# Patient Record
Sex: FEMALE | Race: WHITE | NOT HISPANIC OR LATINO | Employment: UNEMPLOYED | URBAN - METROPOLITAN AREA
[De-identification: names, ages, dates, MRNs, and addresses within clinical notes are randomized per-mention and may not be internally consistent; named-entity substitution may affect disease eponyms.]

---

## 2023-03-31 ENCOUNTER — APPOINTMENT (OUTPATIENT)
Dept: LAB | Facility: LAB | Age: 48
End: 2023-03-31
Payer: MEDICARE

## 2023-03-31 LAB
ALANINE AMINOTRANSFERASE (SGPT) (U/L) IN SER/PLAS: 14 U/L (ref 7–45)
ALBUMIN (G/DL) IN SER/PLAS: 4.3 G/DL (ref 3.4–5)
ALKALINE PHOSPHATASE (U/L) IN SER/PLAS: 42 U/L (ref 33–110)
ANION GAP IN SER/PLAS: 15 MMOL/L (ref 10–20)
ASPARTATE AMINOTRANSFERASE (SGOT) (U/L) IN SER/PLAS: 11 U/L (ref 9–39)
BILIRUBIN TOTAL (MG/DL) IN SER/PLAS: 0.4 MG/DL (ref 0–1.2)
CALCIDIOL (25 OH VITAMIN D3) (NG/ML) IN SER/PLAS: 60 NG/ML
CALCIUM (MG/DL) IN SER/PLAS: 9.6 MG/DL (ref 8.6–10.6)
CARBON DIOXIDE, TOTAL (MMOL/L) IN SER/PLAS: 24 MMOL/L (ref 21–32)
CHLORIDE (MMOL/L) IN SER/PLAS: 106 MMOL/L (ref 98–107)
CREATININE (MG/DL) IN SER/PLAS: 1.03 MG/DL (ref 0.5–1.05)
ESTIMATED AVERAGE GLUCOSE FOR HBA1C: 105 MG/DL
FOLLITROPIN (IU/L) IN SER/PLAS: 35.2 IU/L
GFR FEMALE: 67 ML/MIN/1.73M2
GLUCOSE (MG/DL) IN SER/PLAS: 97 MG/DL (ref 74–99)
HEMOGLOBIN A1C/HEMOGLOBIN TOTAL IN BLOOD: 5.3 %
LUTEINIZING HORMONE (IU/ML) IN SER/PLAS: 20.8 IU/L
PARATHYRIN INTACT (PG/ML) IN SER/PLAS: 23.3 PG/ML (ref 18.5–88)
POTASSIUM (MMOL/L) IN SER/PLAS: 4.4 MMOL/L (ref 3.5–5.3)
PROLACTIN (UG/L) IN SER/PLAS: 14 UG/L (ref 3–20)
PROTEIN TOTAL: 6.9 G/DL (ref 6.4–8.2)
SODIUM (MMOL/L) IN SER/PLAS: 141 MMOL/L (ref 136–145)
THYROPEROXIDASE AB (IU/ML) IN SER/PLAS: <28 IU/ML
THYROTROPIN (MIU/L) IN SER/PLAS BY DETECTION LIMIT <= 0.05 MIU/L: 0.26 MIU/L (ref 0.44–3.98)
THYROXINE (T4) FREE (NG/DL) IN SER/PLAS: 1.45 NG/DL (ref 0.78–1.48)
TRIIODOTHYRONINE (T3) (NG/DL) IN SER/PLAS: 141 NG/DL (ref 60–200)
UREA NITROGEN (MG/DL) IN SER/PLAS: 10 MG/DL (ref 6–23)

## 2023-04-21 ENCOUNTER — HOSPITAL ENCOUNTER (OUTPATIENT)
Dept: DATA CONVERSION | Facility: HOSPITAL | Age: 48
End: 2023-04-21
Attending: OTOLARYNGOLOGY | Admitting: OTOLARYNGOLOGY
Payer: MEDICARE

## 2023-04-21 DIAGNOSIS — Z87.891 PERSONAL HISTORY OF NICOTINE DEPENDENCE: ICD-10-CM

## 2023-04-21 DIAGNOSIS — E03.9 HYPOTHYROIDISM, UNSPECIFIED: ICD-10-CM

## 2023-04-21 DIAGNOSIS — K21.9 GASTRO-ESOPHAGEAL REFLUX DISEASE WITHOUT ESOPHAGITIS: ICD-10-CM

## 2023-04-21 DIAGNOSIS — M79.7 FIBROMYALGIA: ICD-10-CM

## 2023-04-21 DIAGNOSIS — E06.3 AUTOIMMUNE THYROIDITIS: ICD-10-CM

## 2023-04-21 DIAGNOSIS — E66.9 OBESITY, UNSPECIFIED: ICD-10-CM

## 2023-04-21 DIAGNOSIS — F31.9 BIPOLAR DISORDER, UNSPECIFIED (MULTI): ICD-10-CM

## 2023-04-21 DIAGNOSIS — J34.89 OTHER SPECIFIED DISORDERS OF NOSE AND NASAL SINUSES: ICD-10-CM

## 2023-04-21 DIAGNOSIS — J32.8 OTHER CHRONIC SINUSITIS: ICD-10-CM

## 2023-04-21 DIAGNOSIS — J32.3 CHRONIC SPHENOIDAL SINUSITIS: ICD-10-CM

## 2023-04-21 DIAGNOSIS — J34.2 DEVIATED NASAL SEPTUM: ICD-10-CM

## 2023-05-17 LAB
COMPLETE PATHOLOGY REPORT: NORMAL
CONVERTED FINAL DIAGNOSIS: NORMAL
CONVERTED FINAL REPORT PDF LINK TO COPY AND PASTE: NORMAL
CONVERTED GROSS DESCRIPTION: NORMAL

## 2023-09-14 NOTE — H&P
History & Physical Reviewed:   Pregnant/Lactating:  ·  Are You Pregnant no   ·  Are You Currently Breastfeeding no     I have reviewed the History and Physical dated:  24-Mar-2023   History and Physical reviewed and relevant findings noted. Patient examined to review pertinent physical  findings.: No significant changes   Home Medications Reviewed: no changes noted   Allergies Reviewed: no changes noted       ERAS (Enhanced Recovery After Surgery):  ·  ERAS Patient: no     Consent:   COVID-19 Consent:  ·  COVID-19 Risk Consent Surgeon has reviewed key risks related to the risk of charisse COVID-19 and if they contract COVID-19 what the risks are.     Attestation:   Note Completion:  I am a:  Resident/Fellow   Attending Attestation I saw and evaluated the patient.  I personally obtained the key and critical portions of the history and physical exam or was physically present for key and  critical portions performed by the resident/fellow. I reviewed the resident/fellow?s documentation and discussed the patient with the resident/fellow.  I agree with the resident/fellow?s medical decision making as documented in the note.     I personally evaluated the patient on 21-Apr-2023         Electronic Signatures:  Deana Cerda (Resident))  (Signed 21-Apr-2023 07:30)   Authored: History & Physical Reviewed, ERAS, Consent,  Note Completion  Neri Saldana)  (Signed 27-Apr-2023 11:58)   Authored: Note Completion   Co-Signer: History & Physical Reviewed, ERAS, Consent, Note Completion      Last Updated: 27-Apr-2023 11:58 by Neri Saldana)

## 2023-10-02 NOTE — OP NOTE
PROCEDURE DETAILS    Preoperative Diagnosis:  1. Nasal obstruction  2. Septal deviation  3. Nasal valve collapse  4. Inferior turbinate hypertrophy   Postoperative Diagnosis:  1. Nasal obstruction  2. Septal deviation  3. Nasal valve collapse  4. Inferior turbinate hypertrophy   Surgeon: Vick Sr MD  Resident/Fellow/Other Assistant: Deana Cerda MD     Procedure:  1. Septoplasty  2. Nasal valve repair with alar rim grafts  3. Inferior turbinate reduction and outfracture   Anesthesia: GETA  Estimated Blood Loss: 5  Findings: left caudal septal deviation, S-shaped septal deviation posteriorly, alar rim grafts placed, Pond splints   Specimens(s) Collected: no,     Complications: none  Implants: Pond splints   Patient Returned To/Condition: pacu/satisfactory         Operative Report:   The patient presented with nasal obstruction.  The patient was found to have significant nasal septal deviation, nasal valve collapse, and inferior turbinate  hypertrophy.  I discussed with the patient, in detail, the risks, benefits, limitations, and alternatives to the planned procedures. Risks discussed included but were not limited to infection, bleeding, septal perforation, synechia, need for further surgery,  failure to achieve our desired results, worsened breathing, and worsened appearance. The patient expressed understanding of each of these complications and had all questions answered.    DESCRIPTION OF PROCEDURE:  The patient was brought to the operating room and placed in the supine position, then intubated under general anesthesia.  The nose was injected with 1% lidocaine with epinephrine and then packed with decongestant-soaked pledgets.  The face was prepped  and draped in the usual sterile fashion.  Dr. Saldana completed his portion of the procedure - please see his operative note for further details. A left hemitransfixion incision was performed and mucoperichondrial flaps were elevated on the left and   subsequently the right.  The deviated septum was treated by removing deformed quadrangular cartilage and bony septum and septal cartilage was harvested, taking care to preserve a >1.0 cm L-shaped strut.     The caudal septum was freed from the anterior nasal spine. There was a stepoff on the right inferior caudal septum along the maxillary crest that was removed with a osteotome. The caudal deviation was C-shaped and the caudal septum was secured to the  anterior nasal spine with PDS. This   was felt to be more midline with improvement in the internal nasal valve.     To optimize the shape and position of the ala, alar rim grafts were shaped from the removed cartilage and placed. An alar incision was made first on the left and then on the right and a pocket in the rim was opened using iris scissors. Bilateral alar  rim grafts were then placed. The incision was closed with 5-0 fast interrupted sutures.     Bilateral inferior turbinate reduction and lateralization was then performed.  A [place] fine needle tip bovie electrocautery was used to perform intramural cauterization for submucous resection bilaterally.  A [place] elevator was then used to outfracture  the inferior turbinate bilaterally.     We then turned our attention to closure.  Meticulous closure was performed using gut suture in a simple, interrupted fashion.  The nose was gently cleansed with sterile saline and Pond splints were placed in the usual fashion.  This concluded the goals  of the procedure.  The patient was turned over to Anesthesia, extubated, and transferred to the PACU.    Dr. Sr was present for critical portions of the procedure.,                        Attestation:   Note Completion:  Attending Attestation I was present for the entire procedure    I am a: Resident/Fellow         Electronic Signatures:  Deana Cerda (Resident))  (Signed 21-Apr-2023 15:02)   Authored: Post-Operative Note, Chart Review, Note Completion  Orin  Vick BUTTS)  (Signed 24-Apr-2023 07:45)   Authored: Post-Operative Note, Chart Review, Note Completion   Co-Signer: Post-Operative Note, Chart Review, Note Completion      Last Updated: 24-Apr-2023 07:45 by Vick Sr)

## 2023-10-31 ENCOUNTER — TELEPHONE (OUTPATIENT)
Dept: OTOLARYNGOLOGY | Facility: CLINIC | Age: 48
End: 2023-10-31
Payer: MEDICARE

## 2023-10-31 DIAGNOSIS — M89.8X8 MASS OF PETROUS TEMPORAL BONE: ICD-10-CM

## 2023-10-31 DIAGNOSIS — G44.211 INTRACTABLE EPISODIC TENSION-TYPE HEADACHE: ICD-10-CM

## 2023-10-31 NOTE — TELEPHONE ENCOUNTER
Patient called with severe headaches for the past week. She was able to get an appointment next week. Dr. 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 Patient is aware of the plan and order has been placed

## 2023-11-03 ENCOUNTER — HOSPITAL ENCOUNTER (OUTPATIENT)
Dept: RADIOLOGY | Facility: HOSPITAL | Age: 48
Discharge: HOME | End: 2023-11-03
Payer: MEDICARE

## 2023-11-03 DIAGNOSIS — M89.8X8 MASS OF PETROUS TEMPORAL BONE: ICD-10-CM

## 2023-11-03 DIAGNOSIS — G44.211 INTRACTABLE EPISODIC TENSION-TYPE HEADACHE: ICD-10-CM

## 2023-11-03 PROBLEM — R60.9 SWELLING OF BODY REGION: Status: ACTIVE | Noted: 2023-11-03

## 2023-11-03 PROBLEM — J32.3 CHRONIC SPHENOIDAL SINUSITIS: Status: ACTIVE | Noted: 2023-11-03

## 2023-11-03 PROBLEM — Z86.19 HX OF HERPES GENITALIS: Status: ACTIVE | Noted: 2023-11-03

## 2023-11-03 PROBLEM — M25.376 INSTABILITY OF FOOT JOINT: Status: ACTIVE | Noted: 2018-08-06

## 2023-11-03 PROBLEM — F41.1 GAD (GENERALIZED ANXIETY DISORDER): Status: ACTIVE | Noted: 2017-09-18

## 2023-11-03 PROBLEM — M54.81 OCCIPITAL NEURALGIA OF RIGHT SIDE: Status: ACTIVE | Noted: 2023-11-03

## 2023-11-03 PROBLEM — D36.10 NEUROMA: Status: ACTIVE | Noted: 2018-08-06

## 2023-11-03 PROBLEM — R30.0 DYSURIA: Status: ACTIVE | Noted: 2023-11-03

## 2023-11-03 PROBLEM — G43.909 MIGRAINE: Status: ACTIVE | Noted: 2023-11-03

## 2023-11-03 PROBLEM — M79.671 PAIN IN RIGHT FOOT: Status: ACTIVE | Noted: 2018-08-06

## 2023-11-03 PROBLEM — F98.8 ATTENTION DEFICIT DISORDER (ADD) WITHOUT HYPERACTIVITY: Status: ACTIVE | Noted: 2017-06-30

## 2023-11-03 PROBLEM — R51.9 GENERALIZED HEADACHES: Status: ACTIVE | Noted: 2023-11-03

## 2023-11-03 PROBLEM — R22.0 SWOLLEN NOSE: Status: ACTIVE | Noted: 2023-11-03

## 2023-11-03 PROBLEM — M95.0 NASAL DEFORMITY: Status: ACTIVE | Noted: 2023-11-03

## 2023-11-03 PROBLEM — Q30.9 NOSE ABNORMALITY: Status: ACTIVE | Noted: 2023-11-03

## 2023-11-03 PROBLEM — R09.81 NASAL CONGESTION WITH RHINORRHEA: Status: ACTIVE | Noted: 2023-11-03

## 2023-11-03 PROBLEM — F33.0 MILD EPISODE OF RECURRENT MAJOR DEPRESSIVE DISORDER (CMS-HCC): Status: ACTIVE | Noted: 2023-11-03

## 2023-11-03 PROBLEM — R44.8 FACIAL PRESSURE: Status: ACTIVE | Noted: 2023-11-03

## 2023-11-03 PROBLEM — E66.9 OBESITY (BMI 30.0-34.9): Status: ACTIVE | Noted: 2017-04-28

## 2023-11-03 PROBLEM — F90.9 ADHD: Status: ACTIVE | Noted: 2023-11-03

## 2023-11-03 PROBLEM — J39.2 THORNWALDT'S CYST: Status: ACTIVE | Noted: 2023-11-03

## 2023-11-03 PROBLEM — E66.9 OBESITY: Status: ACTIVE | Noted: 2023-11-03

## 2023-11-03 PROBLEM — J34.89 NASAL CONGESTION WITH RHINORRHEA: Status: ACTIVE | Noted: 2023-11-03

## 2023-11-03 PROBLEM — M77.42 METATARSALGIA OF BOTH FEET: Status: ACTIVE | Noted: 2018-08-06

## 2023-11-03 PROBLEM — F90.9 ADULT ADHD: Status: ACTIVE | Noted: 2017-04-03

## 2023-11-03 PROBLEM — E55.9 VITAMIN D DEFICIENCY: Status: ACTIVE | Noted: 2017-04-03

## 2023-11-03 PROBLEM — H74.8X9: Status: ACTIVE | Noted: 2023-11-03

## 2023-11-03 PROBLEM — M77.41 METATARSALGIA OF BOTH FEET: Status: ACTIVE | Noted: 2018-08-06

## 2023-11-03 PROBLEM — E66.811 OBESITY (BMI 30.0-34.9): Status: ACTIVE | Noted: 2017-04-28

## 2023-11-03 PROBLEM — M25.519 SHOULDER PAIN: Status: ACTIVE | Noted: 2018-01-13

## 2023-11-03 PROBLEM — E23.6 PITUITARY MASS (MULTI): Status: ACTIVE | Noted: 2023-11-03

## 2023-11-03 PROBLEM — Z98.82 H/O BREAST AUGMENTATION: Status: ACTIVE | Noted: 2022-05-25

## 2023-11-03 PROBLEM — R53.83 FATIGUE: Status: ACTIVE | Noted: 2017-04-03

## 2023-11-03 PROBLEM — E53.8 VITAMIN B 12 DEFICIENCY: Status: ACTIVE | Noted: 2017-04-03

## 2023-11-03 PROBLEM — L03.113 CELLULITIS OF RIGHT HAND: Status: ACTIVE | Noted: 2023-11-03

## 2023-11-03 PROBLEM — H71.90: Status: ACTIVE | Noted: 2023-11-03

## 2023-11-03 PROCEDURE — 70480 CT ORBIT/EAR/FOSSA W/O DYE: CPT | Performed by: RADIOLOGY

## 2023-11-03 PROCEDURE — 70480 CT ORBIT/EAR/FOSSA W/O DYE: CPT

## 2023-11-03 RX ORDER — ERGOCALCIFEROL 1.25 MG/1
1 CAPSULE ORAL
COMMUNITY

## 2023-11-03 RX ORDER — NORETHINDRONE ACETATE AND ETHINYL ESTRADIOL 1MG-20(21)
1 KIT ORAL DAILY
COMMUNITY
Start: 2018-09-19

## 2023-11-03 RX ORDER — TRAMADOL HYDROCHLORIDE 50 MG/1
1 TABLET ORAL EVERY 8 HOURS PRN
COMMUNITY
Start: 2022-04-15

## 2023-11-03 RX ORDER — NORETHINDRONE ACETATE AND ETHINYL ESTRADIOL 1; 20 MG/1; UG/1
1 TABLET ORAL DAILY
COMMUNITY
Start: 2022-12-12 | End: 2023-03-06 | Stop reason: WASHOUT

## 2023-11-03 RX ORDER — LANSOPRAZOLE 30 MG/1
CAPSULE, DELAYED RELEASE ORAL
COMMUNITY
Start: 2020-11-18

## 2023-11-03 RX ORDER — SODIUM CHLORIDE 0.65 %
2 AEROSOL, SPRAY (ML) NASAL EVERY 4 HOURS
COMMUNITY
Start: 2023-04-24

## 2023-11-03 RX ORDER — LISDEXAMFETAMINE DIMESYLATE 50 MG/1
50 CAPSULE ORAL DAILY
COMMUNITY

## 2023-11-03 RX ORDER — TRAZODONE HYDROCHLORIDE 150 MG/1
150 TABLET ORAL NIGHTLY
COMMUNITY
Start: 2020-03-03

## 2023-11-03 RX ORDER — BROMPHENIRAMINE MALEATE, PSEUDOEPHEDRINE HYDROCHLORIDE, AND DEXTROMETHORPHAN HYDROBROMIDE 2; 30; 10 MG/5ML; MG/5ML; MG/5ML
SYRUP ORAL 4 TIMES DAILY PRN
COMMUNITY
Start: 2022-09-15

## 2023-11-03 RX ORDER — FLUTICASONE PROPIONATE 50 MCG
2 SPRAY, SUSPENSION (ML) NASAL DAILY
COMMUNITY

## 2023-11-03 RX ORDER — LEVOTHYROXINE SODIUM 125 UG/1
125 TABLET ORAL DAILY
COMMUNITY
Start: 2020-02-04

## 2023-11-03 RX ORDER — DOCUSATE SODIUM 100 MG/1
100 CAPSULE, LIQUID FILLED ORAL 2 TIMES DAILY
COMMUNITY
Start: 2022-06-10

## 2023-11-03 RX ORDER — NAPROXEN 500 MG/1
1 TABLET ORAL 2 TIMES DAILY PRN
COMMUNITY
Start: 2022-03-10

## 2023-11-03 RX ORDER — ZOLPIDEM TARTRATE 5 MG/1
5 TABLET ORAL NIGHTLY PRN
COMMUNITY

## 2023-11-03 RX ORDER — BUTYROSPERMUM PARKII(SHEA BUTTER), SIMMONDSIA CHINENSIS (JOJOBA) SEED OIL, ALOE BARBADENSIS LEAF EXTRACT .01; 1; 3.5 G/100G; G/100G; G/100G
250 LIQUID TOPICAL 2 TIMES DAILY PRN
COMMUNITY
Start: 2022-09-15

## 2023-11-03 RX ORDER — BREXPIPRAZOLE 1 MG/1
1.5 TABLET ORAL DAILY
COMMUNITY
Start: 2023-01-15

## 2023-11-03 RX ORDER — SYRINGE WITH NEEDLE, 1 ML 25GX5/8"
SYRINGE, EMPTY DISPOSABLE MISCELLANEOUS
COMMUNITY
Start: 2023-07-12

## 2023-11-03 RX ORDER — CHOLECALCIFEROL (VITAMIN D3) 125 MCG
1 CAPSULE ORAL DAILY
COMMUNITY
Start: 2017-12-11

## 2023-11-03 RX ORDER — ACETAMINOPHEN 500 MG
1 TABLET ORAL EVERY 6 HOURS PRN
COMMUNITY
Start: 2022-06-10

## 2023-11-03 RX ORDER — IBUPROFEN 600 MG/1
600 TABLET ORAL EVERY 8 HOURS PRN
COMMUNITY
Start: 2023-07-24

## 2023-11-03 RX ORDER — DEXTROAMPHETAMINE SACCHARATE, AMPHETAMINE ASPARTATE MONOHYDRATE, DEXTROAMPHETAMINE SULFATE AND AMPHETAMINE SULFATE 7.5; 7.5; 7.5; 7.5 MG/1; MG/1; MG/1; MG/1
30 CAPSULE, EXTENDED RELEASE ORAL DAILY
COMMUNITY

## 2023-11-03 RX ORDER — SODIUM CHLORIDE 0.65 %
2 AEROSOL, SPRAY (ML) NASAL 4 TIMES DAILY
COMMUNITY
Start: 2022-09-15

## 2023-11-03 RX ORDER — LURASIDONE HYDROCHLORIDE 40 MG/1
80 TABLET, FILM COATED ORAL DAILY
COMMUNITY
End: 2024-02-26 | Stop reason: WASHOUT

## 2023-11-03 RX ORDER — EPINEPHRINE 0.3 MG/.3ML
INJECTION SUBCUTANEOUS
COMMUNITY
Start: 2021-06-26

## 2023-11-03 RX ORDER — ALBUTEROL SULFATE 90 UG/1
INHALANT RESPIRATORY (INHALATION) 4 TIMES DAILY PRN
COMMUNITY
Start: 2022-09-16

## 2023-11-03 RX ORDER — TRAMADOL HYDROCHLORIDE 50 MG/1
50 TABLET ORAL EVERY 6 HOURS PRN
COMMUNITY

## 2023-11-03 RX ORDER — CYANOCOBALAMIN 1000 UG/ML
1000 INJECTION, SOLUTION INTRAMUSCULAR; SUBCUTANEOUS
COMMUNITY

## 2023-11-03 RX ORDER — ALBUTEROL SULFATE 0.83 MG/ML
2.5 SOLUTION RESPIRATORY (INHALATION) EVERY 6 HOURS PRN
COMMUNITY
Start: 2022-10-05

## 2023-11-03 RX ORDER — ONDANSETRON 4 MG/1
4 TABLET, FILM COATED ORAL EVERY 6 HOURS PRN
COMMUNITY
End: 2024-02-26 | Stop reason: SDUPTHER

## 2023-11-03 RX ORDER — ESCITALOPRAM OXALATE 20 MG/1
20 TABLET ORAL EVERY MORNING
COMMUNITY
Start: 2015-03-27

## 2023-11-03 RX ORDER — LEVOTHYROXINE SODIUM 88 UG/1
88 TABLET ORAL DAILY
COMMUNITY
Start: 2013-08-01

## 2023-11-03 RX ORDER — LAMOTRIGINE 200 MG/1
1 TABLET ORAL NIGHTLY
COMMUNITY
Start: 2014-02-17

## 2023-11-03 RX ORDER — LIOTHYRONINE SODIUM 5 UG/1
5 TABLET ORAL DAILY
COMMUNITY

## 2023-11-03 RX ORDER — DEXTROAMPHETAMINE SULFATE, DEXTROAMPHETAMINE SACCHARATE, AMPHETAMINE SULFATE AND AMPHETAMINE ASPARTATE 5; 5; 5; 5 MG/1; MG/1; MG/1; MG/1
2 CAPSULE, EXTENDED RELEASE ORAL EVERY MORNING
COMMUNITY
Start: 2013-11-26

## 2023-11-03 RX ORDER — NORETHINDRONE ACETATE AND ETHINYL ESTRADIOL, ETHINYL ESTRADIOL AND FERROUS FUMARATE 1MG-10(24)
1 KIT ORAL DAILY
COMMUNITY

## 2023-11-06 ENCOUNTER — CLINICAL SUPPORT (OUTPATIENT)
Dept: AUDIOLOGY | Facility: CLINIC | Age: 48
End: 2023-11-06
Payer: MEDICARE

## 2023-11-06 ENCOUNTER — OFFICE VISIT (OUTPATIENT)
Dept: OTOLARYNGOLOGY | Facility: CLINIC | Age: 48
End: 2023-11-06
Payer: MEDICARE

## 2023-11-06 VITALS — HEIGHT: 61 IN | WEIGHT: 192.5 LBS | BODY MASS INDEX: 36.35 KG/M2

## 2023-11-06 DIAGNOSIS — M89.8X8 MASS OF PETROUS TEMPORAL BONE: Primary | ICD-10-CM

## 2023-11-06 DIAGNOSIS — H93.19 TINNITUS, UNSPECIFIED LATERALITY: ICD-10-CM

## 2023-11-06 DIAGNOSIS — H90.3 SENSORINEURAL HEARING LOSS (SNHL) OF BOTH EARS: Primary | ICD-10-CM

## 2023-11-06 DIAGNOSIS — D33.3 CEREBELLOPONTINE ANGLE TUMOR (MULTI): ICD-10-CM

## 2023-11-06 PROCEDURE — 1036F TOBACCO NON-USER: CPT | Performed by: OTOLARYNGOLOGY

## 2023-11-06 PROCEDURE — 92550 TYMPANOMETRY & REFLEX THRESH: CPT

## 2023-11-06 PROCEDURE — 99214 OFFICE O/P EST MOD 30 MIN: CPT | Performed by: OTOLARYNGOLOGY

## 2023-11-06 PROCEDURE — 92557 COMPREHENSIVE HEARING TEST: CPT

## 2023-11-06 RX ORDER — LEVOFLOXACIN 500 MG/1
500 TABLET, FILM COATED ORAL
Qty: 7 TABLET | Refills: 0 | Status: SHIPPED | OUTPATIENT
Start: 2023-11-06 | End: 2023-11-13

## 2023-11-06 ASSESSMENT — PATIENT HEALTH QUESTIONNAIRE - PHQ9
2. FEELING DOWN, DEPRESSED OR HOPELESS: NOT AT ALL
1. LITTLE INTEREST OR PLEASURE IN DOING THINGS: NOT AT ALL
SUM OF ALL RESPONSES TO PHQ9 QUESTIONS 1 & 2: 0

## 2023-11-06 ASSESSMENT — PAIN SCALES - GENERAL
PAINLEVEL_OUTOF10: 4
PAINLEVEL: 4

## 2023-11-06 ASSESSMENT — PAIN DESCRIPTION - DESCRIPTORS: DESCRIPTORS: DULL

## 2023-11-06 NOTE — PROGRESS NOTES
AUDIOLOGIC EVALUATION  Name: Katy Hogue  YOB: 1975  MRN: [id number]  Age: 48 y.o.    Date of Evaluation:  11/6/2023    History:  Katy Hogue, 48 y.o., was seen today at the request of Shiv Pa MD for a hearing evaluation. Recall, she has a history of a right-sided petrous apex lesion.  The patient reported a family history of hearing loss. She noted tinnitus. She reported a constant dull ache in her right ear secondary to headache. She has a history of vertiginous dizziness a few times a month. She reported that she will wake up with this dizziness and do the Epley maneuver at home to treat it. She denied a history of ear surgeries.     Previous audiologic evaluation on 5/27/2021 at an outside clinic revealed normal hearing though 2000 Hz sloping to a mild hearing loss in the right ear and normal hearing through 2000 Hz sloping to a moderate sensorineural hearing loss in the left ear. Word recognition abilities were measured to be excellent in both ears.     Evaluation:    Otoscopy  Clear ear canals bilaterally.    Tympanometry  Right ear:Type AD tympanogram, normal ear canal volume and high compliance  Left ear: Type A tympanogram, normal ear canal volume and compliance     Acoustic Reflexes  Right ear: Ipsilateral acoustic reflexes present at 500 - 1000 Hz (no response at 2000 - 4000 Hz). Contralateral (probe right, stimulus left) acoustic reflexes present 500 - 4000 Hz. Acoustic reflex decay could not be tested due to equipment limits at 500 Hz and high stimulus artifact at 1000 Hz.  Left ear: Ipsilateral acoustic reflexes present at 500 - 2000 Hz (no response at 4000 Hz). Contralateral (probe left, stimulus right) acoustic reflexes absent 500 - 4000 Hz. Acoustic reflex decay could not be tested due to equipment limits.     Audiometric Evaluation  Right ear: hearing sensitivity within normal limits at 250 Hz sloping to essentially mild sensorineural hearing loss. Word recognition  ability estimated to be excellent (96%) at 65 dB HL based on an NU-6 recorded 25-word list.  Left ear: hearing sensitivity within normal limits at 250 Hz sloping to a moderately-severe sensorineural hearing loss. Word recognition ability estimated to be excellent (100%) at 70 dB HL based on an NU-6 recorded ordered by difficulty 10-word list.    When compared to previous test results of 5/27/2021, thresholds are decreased from 250 - 2000 Hz in both ears. All other thresholds are stable.     The test results were discussed with the patient and they were returned to Shiv Pa MD for completion of the office visit.    Impressions  Today's evaluation revealed normal hearing at 250 Hz sloping to an essentially mild sensorineural hearing loss in the right ear and hearing sensitivity within normal limits at 250 Hz sloping to a moderately-severe sensorineural hearing loss in the left ear. Word recognition abilities were measured to be excellent in both ears. Tympanograms were type A (normal) in both ears.    The patient was informed that they are a candidate for binaural amplification. She stated that she is not interested in hearing aids at this time.     Recommendations  - Continue medical follow-up with established providers   - Continue medical follow-up with Shiv Pa MD  - Re-test hearing annually  - Consider binaural amplification    Time: 1140-2489    RICKIE Campoverde, CCC-A  Licensed Audiologist

## 2023-11-06 NOTE — LETTER
November 6, 2023     Katie Gutierrez MD  [address] [place] [phone]    Patient: Katy Hogue   YOB: 1975   Date of Visit: 11/6/2023       Dear Dr. Katie Gutierrez MD:    Thank you for referring Katy Hogue to me for evaluation. Below are my notes for this consultation.  If you have questions, please do not hesitate to call me. I look forward to following your patient along with you.       Sincerely,     RICKIE Campoverde, CCC-A      CC: No Recipients  ______________________________________________________________________________________    AUDIOLOGIC EVALUATION  Name: Katy Hogue  YOB: 1975  MRN: [id number]  Age: 48 y.o.    Date of Evaluation:  11/6/2023    History:  Katy Hogue, 48 y.o., was seen today at the request of Shiv Pa MD for a hearing evaluation. Recall, she has a history of a right-sided petrous apex lesion.  The patient reported a family history of hearing loss. She noted tinnitus. She reported a constant dull ache in her right ear secondary to headache. She has a history of vertiginous dizziness a few times a month. She reported that she will wake up with this dizziness and do the Epley maneuver at home to treat it. She denied a history of ear surgeries.     Previous audiologic evaluation on 5/27/2021 at an outside clinic revealed normal hearing though 2000 Hz sloping to a mild hearing loss in the right ear and normal hearing through 2000 Hz sloping to a moderate sensorineural hearing loss in the left ear. Word recognition abilities were measured to be excellent in both ears.     Evaluation:    Otoscopy  Clear ear canals bilaterally.    Tympanometry  Right ear:Type AD tympanogram, normal ear canal volume and high compliance  Left ear: Type A tympanogram, normal ear canal volume and compliance     Acoustic Reflexes  Right ear: Ipsilateral acoustic reflexes present at 500 - 1000 Hz (no response at 2000 - 4000 Hz). Contralateral (probe right,  stimulus left) acoustic reflexes present 500 - 4000 Hz. Acoustic reflex decay could not be tested due to equipment limits at 500 Hz and high stimulus artifact at 1000 Hz.  Left ear: Ipsilateral acoustic reflexes present at 500 - 2000 Hz (no response at 4000 Hz). Contralateral (probe left, stimulus right) acoustic reflexes absent 500 - 4000 Hz. Acoustic reflex decay could not be tested due to equipment limits.     Audiometric Evaluation  Right ear: hearing sensitivity within normal limits at 250 Hz sloping to essentially mild sensorineural hearing loss. Word recognition ability estimated to be excellent (96%) at 65 dB HL based on an NU-6 recorded 25-word list.  Left ear: hearing sensitivity within normal limits at 250 Hz sloping to a moderately-severe sensorineural hearing loss. Word recognition ability estimated to be excellent (100%) at 70 dB HL based on an NU-6 recorded ordered by difficulty 10-word list.    When compared to previous test results of 5/27/2021, thresholds are decreased from 250 - 2000 Hz in both ears. All other thresholds are stable.     The test results were discussed with the patient and they were returned to Shiv Pa MD for completion of the office visit.    Impressions  Today's evaluation revealed normal hearing at 250 Hz sloping to an essentially mild sensorineural hearing loss in the right ear and hearing sensitivity within normal limits at 250 Hz sloping to a moderately-severe sensorineural hearing loss in the left ear. Word recognition abilities were measured to be excellent in both ears. Tympanograms were type A (normal) in both ears.    The patient was informed that they are a candidate for binaural amplification. She stated that she is not interested in hearing aids at this time.     Recommendations  - Continue medical follow-up with established providers   - Continue medical follow-up with Shiv Pa MD  - Re-test hearing annually  - Consider binaural amplification    Time:  3337-0078    RICKIE Campoverde, CCC-A  Licensed Audiologist

## 2023-11-06 NOTE — PROGRESS NOTES
History of present illness:  Katy Hogue is a 48 y.o. female presenting today for her follow-up visit. She reports with the weather change she experiences nasal congestion, mild rhinorrhea and nasal drainage. However she states that when she was placed on steroid treatment, she experienced side-effects to the medications. She admits to not completing the antibiotic course she was placed on.    RECALL 02/03/2023  The patient is a 47 year old female presenting in clinic today for a follow-up visit with complaints of severe headaches. She has a history of a right-sided petrous apex lesion. The patient states that her headaches mainly occur at night on her right side and causes her to experience sleep disturbances at times. Of note, she reports that she has gained over 10 lbs while taking Prednisone. Symptoms started and worsened after covid.     RECALL 09/16/22:  The patient is a 47-year-old female presenting for a virtual visit to follow-up on a right-sided petrous apex lesion. She reports that she normally experiences headaches that last for 1-2 days, but notes that she had one that lasted for 2 weeks straight that was debilitating. Her headaches occur on her right side, behind her eye.  The patient is currently sick with COVID at this time.       RECALL 04/15/22:  The patient is a 46-year-old female presenting in clinic for a follow-up visit to discuss her MRI scan results and she is accompanied by her fiance. The patient denies experiencing some blurry vision in her right eye, but states that she has had issues with her vision in that eye even after getting new glasses. She continues to have episodic retro-orbital pain on the right side that can be quite severe and is not relieved by taking Motrin. I have tried high-dose prednisone and Levaquin in the past but that did not seem to improve her symptoms.     RECALL 07/27/21:  45 year old female who is referred for evaluation of right petrous apex lesion at the  request of Dr Hernandez.   The patient reports being followed with serial scans for pituitary lesion. This right petrous apex lesion was found incidentally on this routine imaging. She does reports 5-6 years right otalgia. Endorses right sided headaches . Both are worsened with nasal congestion. Endorses dizziness with lightheadedness with moving head or getting up from standing. The patient denies subjective hearing loss.    The patient's current medications, active allergies and list of medical problems were reviewed in the EHR and confirmed electronically there are as follows;  Allergies:   Allergies   Allergen Reactions    Amoxicillin Anaphylaxis and Other     Other reaction(s): abnormal breathing    Amoxicillin (Bulk) Anaphylaxis    Bee Venom Protein (Honey Bee) Anaphylaxis, Hives, Shortness of breath, Itching, Swelling, Rash, Other, Dizziness and Blurred vision     redness    Fluticasone Propionate Anaphylaxis    Wasp Venom Anaphylaxis, Hives, Shortness of breath, Itching, Swelling, Rash, Other, Dizziness and Blurred vision     redness    Fluconazole Unknown    Penicillin Unknown    Penicillin G Unknown     Current list of medications:   Current Outpatient Medications   Medication Sig Dispense Refill    acetaminophen (Tylenol) 500 mg tablet Take 1 tablet (500 mg) by mouth every 6 hours if needed for fever (temp greater than 38.0 C) (pain).      Adderall XR 20 mg 24 hr capsule Take 2 capsules (40 mg) by mouth once daily in the morning. for ADHD      albuterol 2.5 mg /3 mL (0.083 %) nebulizer solution Take 3 mL (2.5 mg) by nebulization every 6 hours if needed for wheezing.      albuterol 90 mcg/actuation aerosol powdr breath activated inhaler Inhale 2 puffs every 6 hours.      albuterol sulfate (Proair Digihaler) 90 mcg/actuation aero powdr breath act w/sensor inhaler Inhale 4 times a day as needed. TAKE METERED DOSE FOUR TIMES A DAY AS NEEDED      amphetamine-dextroamphetamine XR (Adderall XR) 30 mg 24 hr capsule  "Take 1 capsule (30 mg) by mouth once daily.      BD Luer-Colleen Syringe 3 mL 23 gauge x 1 1/2\" syringe USE AS DIRECTED WITH B-12      brompheniramine-pseudoeph-DM 2-30-10 mg/5 mL syrup Take by mouth 4 times a day as needed.      cholecalciferol (Vitamin D-3) 125 MCG (5000 UT) capsule Take 1 tablet by mouth once daily.      cyanocobalamin (Vitamin B-12) 1,000 mcg/mL injection Inject 1 mL (1,000 mcg) into the muscle. INJECT 1ML INTRAMUSCULARLY ONCE WEEKLY ,FOR 4 WEEKS,THEN ONCE MONTHLY THEREAFTER      docusate sodium (Colace) 100 mg capsule Take 1 capsule (100 mg) by mouth twice a day.      EPINEPHrine 0.3 mg/0.3 mL injection syringe EPINEPHrine 0.3 MG/0.3ML Injection Solution Auto-injector   Quantity: 2  Refills: 0        Start : 26-Jun-2021   Active      ergocalciferol (Vitamin D-2) 1.25 MG (23424 UT) capsule Take 1 capsule (1,250 mcg) by mouth 1 (one) time per week.      escitalopram (Lexapro) 20 mg tablet Take 1 tablet (20 mg) by mouth once daily in the morning.      fluticasone (Flonase) 50 mcg/actuation nasal spray Administer 2 sprays into each nostril once daily.      ibuprofen 600 mg tablet Take 1 tablet (600 mg) by mouth every 8 hours if needed (pain).      lamoTRIgine (LaMICtal) 200 mg tablet Take 1 tablet (200 mg) by mouth once daily at bedtime.      lansoprazole (Prevacid) 30 mg DR capsule Take by mouth.      levothyroxine (Synthroid, Levoxyl) 125 mcg tablet Take 1 tablet (125 mcg) by mouth once daily.      levothyroxine (Synthroid, Levoxyl) 88 mcg tablet Take 1 tablet (88 mcg) by mouth once daily.      liothyronine (Cytomel) 5 mcg tablet Take 1 tablet (5 mcg) by mouth once daily.      Lo Loestrin Fe 1 mg-10 mcg (24)/10 mcg (2) tablet Take 1 tablet by mouth once daily.      lurasidone (Latuda) 40 mg tablet Take 2 tablets (80 mg) by mouth once daily.      naproxen (Naprosyn) 500 mg tablet Take 1 tablet (500 mg) by mouth 2 times a day as needed (pain).      norethindrone-e.estradioL-iron (Junel FE 1/20, 28,) 1 " mg-20 mcg (21)/75 mg (7) tablet Take 1 tablet by mouth once daily. Takes continuously, skipping placebo week.      ondansetron (Zofran) 4 mg tablet Take 1 tablet (4 mg) by mouth every 6 hours if needed for nausea.      Rexulti 1 mg tablet Take 1.5 tablets (1.5 mg) by mouth once daily.      saccharomyces boulardii (Florastor) 250 mg capsule Take 1 capsule (250 mg) by mouth 2 times a day as needed (for loose stool).      Saline Mist 0.65 % nasal spray Administer 2 sprays into each nostril every 4 hours.      sodium chloride (Saline Mist) 0.65 % nasal spray Administer 2 sprays into affected nostril(s) 4 times a day.      traMADol (Ultram) 50 mg tablet Take 1 tablet (50 mg) by mouth every 6 hours if needed (pain).      traMADol (Ultram) 50 mg tablet Take 1 tablet (50 mg) by mouth every 8 hours if needed.      traZODone (Desyrel) 150 mg tablet Take 1 tablet (150 mg) by mouth once daily at bedtime.      Vyvanse 50 mg capsule Take 1 capsule (50 mg) by mouth once daily.      zolpidem (Ambien) 5 mg tablet Take 1 tablet (5 mg) by mouth as needed at bedtime.       No current facility-administered medications for this visit.     Problem list:  Patient Active Problem List   Diagnosis    Abdominal pain    Adult ADHD    Angioedema    Urticaria    ADHD    Attention deficit disorder (ADD) without hyperactivity    Biomechanical lesion, unspecified    Bipolar I disorder (CMS/HCC)    Candidiasis    Cellulitis of right hand    Cholesterin granuloma    Cholesterol granuloma    Chronic rhinitis    Cutaneous skin tags    Dysuria    Elevated insulin-like growth factor 1 (IGF-1) level    Elevation of level of transaminase and lactic acid dehydrogenase (LDH)    Transaminitis    Fatigue    ALONSO (generalized anxiety disorder)    Cervicalgia    Chronic pain disorder    Fibromyalgia    Generalized headaches    Headache    H/O breast augmentation    Hashimoto's thyroiditis    Hepatitis C antibody test positive    Hx of herpes genitalis     Hypothyroidism    Insomnia    Instability of foot joint    Mass of petrous temporal bone    Metatarsalgia of both feet    Migraine    Mild episode of recurrent major depressive disorder (CMS/HCC)    Nausea    Neuroma    Obesity (BMI 30.0-34.9)    Obesity    Occipital neuralgia of right side    Pain in right foot    Pituitary abnormality (CMS/HCC)    Pituitary mass (CMS/HCC)    Acne    Rash    Shoulder pain    Precordial pain    Substernal chest pain    Vitamin B 12 deficiency    Vitamin D deficiency    Chronic sphenoidal sinusitis    Facial pressure    Nasal congestion with rhinorrhea    Nasal deformity    Nose abnormality    Swelling of body region    Swollen nose    Thornwaldt's cyst         Physical Examination:  CONSTITUTIONAL:  No acute distress  VOICE:  No hoarseness or other abnormality  RESPIRATION:  Breathing comfortably, no stridor  CV:  No clubbing/cyanosis/edema in hands  EYES:  EOM intact, sclera clear  NEURO:  Alert and oriented times 3, Cranial nerves II-XII grossly intact and symmetric bilaterally  HEAD AND FACE:  Symmetric facial features, no masses or lesions  RIGHT EAR:  Normal external ear and post auricular area, no visible lesions, external auditory canal patent, tympanic membrane intact, no retraction, no signs of mass, effusion, or infection within the middle ear  LEFT EAR: Normal external ear and post auricular area, no visible lesions, external auditory canal patent, tympanic membrane intact, no retraction, no signs of mass, effusion, or infection within the middle ear  NOSE:  Anterior rhinoscopy clear, no significant external deformity.  ORAL CAVITY/OROPHARYNX/LIPS:  normal lining, mobile tongue.  PHARYNGEAL WALLS: Moist mucosal lining, good palatal elevation  NECK/LYMPH:  No LAD, no thyroid masses, trachea midline  SKIN:  Neck and facial skin is without scar or injury  PSYCH:  Alert and oriented with appropriate mood and affect    Diagnostic testing:  CT scan done last week revealed  a  cystic lesion on the right petrous apex. No evidence of  bony erosion. Overall appearance is not significantly changed from prior testing.     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    I personally reviewed the available patient's external record and independently reviewed their audiometric testing [and] radiographic imaging through the appropriate viewing software as detailed in my note and agree with the detailed report.    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  Right petrous apex cystic lesion ; cholesterol granuloma versus trapped PA mucous.  Right-sided headache and Retroorbital Pain    Recommendation:  We discussed the treatment options. She mentioned that she has had medical management for a long time  and she is not very excited a bout the idea of going back on steroids because of the side effects which are disabling. At this point I recommended that she have an extended right sided MCF for drainage of cyst. I am referring her to Dr Cooper. I discussed the pros and cons of that procedure and the risks involved. I will give her Levaquin 500 mg for seven days. She will have a hearing  test done on 11/07/2023.       Scribe Attestation  By signing my name below, IFrancine Scribe   attest that this documentation has been prepared under the direction and in the presence of Shiv Pa MD.

## 2023-11-14 ENCOUNTER — TELEPHONE (OUTPATIENT)
Dept: OTOLARYNGOLOGY | Facility: CLINIC | Age: 48
End: 2023-11-14
Payer: MEDICARE

## 2023-11-14 DIAGNOSIS — M89.8X8 MASS OF PETROUS TEMPORAL BONE: ICD-10-CM

## 2023-11-14 NOTE — TELEPHONE ENCOUNTER
patient called regarding visit last week requesting steroid prescription for headaches as discussed. medication pend and send to Dr. Pa for review.

## 2023-11-15 RX ORDER — PREDNISONE 10 MG/1
TABLET ORAL
Qty: 75 TABLET | Refills: 0 | Status: SHIPPED | OUTPATIENT
Start: 2023-11-15 | End: 2023-11-29

## 2023-11-22 ENCOUNTER — TELEPHONE (OUTPATIENT)
Dept: OTOLARYNGOLOGY | Facility: HOSPITAL | Age: 48
End: 2023-11-22
Payer: MEDICARE

## 2023-11-22 NOTE — TELEPHONE ENCOUNTER
Patient called with concerns of steroid taper side effects. Patient had concerns with blood sugar slightly elevated to 150s. I explained that this was likely a result of the high dose of steroids. Dr. Pa asked me to contact the patient and inform her he was not concerned with blood sugar level at this time. If blood sugar does not start to trend down patient instructed to adjust taper by 10mg and taper off one day sooner and to follow up with PCP. Patient said her headaches are not as painful but she still does notice pressure with them. I asked patient to continue to monitor her symptoms and to contact the office with additional concerns.

## 2023-12-14 ENCOUNTER — OFFICE VISIT (OUTPATIENT)
Dept: NEUROSURGERY | Facility: CLINIC | Age: 48
End: 2023-12-14
Payer: MEDICARE

## 2023-12-14 VITALS
BODY MASS INDEX: 36.25 KG/M2 | DIASTOLIC BLOOD PRESSURE: 87 MMHG | SYSTOLIC BLOOD PRESSURE: 136 MMHG | HEIGHT: 61 IN | HEART RATE: 99 BPM | WEIGHT: 192 LBS | TEMPERATURE: 97.5 F

## 2023-12-14 DIAGNOSIS — M54.81 OCCIPITAL NEURALGIA, UNSPECIFIED LATERALITY: Primary | ICD-10-CM

## 2023-12-14 DIAGNOSIS — D33.3 CEREBELLOPONTINE ANGLE TUMOR (MULTI): ICD-10-CM

## 2023-12-14 PROCEDURE — 1036F TOBACCO NON-USER: CPT | Performed by: NEUROLOGICAL SURGERY

## 2023-12-14 PROCEDURE — 99203 OFFICE O/P NEW LOW 30 MIN: CPT | Performed by: NEUROLOGICAL SURGERY

## 2023-12-14 PROCEDURE — 99213 OFFICE O/P EST LOW 20 MIN: CPT | Performed by: NEUROLOGICAL SURGERY

## 2023-12-14 ASSESSMENT — PAIN SCALES - GENERAL: PAINLEVEL: 2

## 2023-12-14 NOTE — PROGRESS NOTES
"Chief Complaint:   NPV     History Of Present Illness:    Katy Hogue is a 48-year-old female with a PMH significant for Hashimoto's thyroiditis,  ADHD, Bipolar 1 disorder, ALONSO, fibromyalgia, migraine HA, PTSD, and pituitary cyst who is following with ENT for severe headaches and known history of right-sided petrous apex lesion. Audiogram completed 11/06 with mild SNL on the right and moderate to severe SNHL on the left. Patient has been treated with prednisone in the past with no improvement of symptoms. Patient was referred to neurosurgery to discuss surgical treatment options. She presents to clinic for NPV.     Patient describes the discomfort that she experiences to be primarily originating in the right occipital area and behind the ear.  This then extends towards retro-orbital discomfort.  She finds that resolves with steroids and  Antibiotics.  There is no specific occipital trigger point, but she feels an abnormal sensation in the right occipital area in general during episodes.  The episodes of pain have been occurring more frequently in the last several months, requiring her to be treated with steroids she believes approximately 5-6 times within the last year.      Vital Signs   /87   Pulse 99   Temp 36.4 °C (97.5 °F)   Ht 1.549 m (5' 1\")   Wt 87.1 kg (192 lb)   BMI 36.28 kg/m²          Physical Exam:  She is awake and alert.  Her speech is fluent.  She has full extraocular movements.  V1 to V3 sensation is intact.  She has full strength in upper and lower extremities.  Hearing is intact to finger rub bilaterally.  Face symmetric.  There is no drift.        Past Medical History:  Past Medical History:   Diagnosis Date    Carpal tunnel syndrome, unspecified upper limb 12/17/2013    Carpal tunnel syndrome    Disease of stomach and duodenum, unspecified 12/17/2013    Gastric and duodenal disease    Esophagitis, unspecified without bleeding 12/17/2013    Esophagitis    Other intervertebral disc " "displacement, lumbar region 12/17/2013    Herniated nucleus pulposus, L4-5    Other specified abnormal immunological findings in serum     False positive serological test for hepatitis C    Personal history of peptic ulcer disease     History of stomach ulcers    Sacroiliitis, not elsewhere classified (CMS/HCC) 12/17/2013    Sacroiliitis    Spondylosis without myelopathy or radiculopathy, lumbar region 12/17/2013    Lumbar spondylosis       Past Surgical History:   Past Surgical History:   Procedure Laterality Date    CHOLECYSTECTOMY  03/27/2015    Cholecystectomy    OOPHORECTOMY  03/27/2015    Oophorectomy - Unilateral (Removal Of One Ovary)    OTHER SURGICAL HISTORY  03/27/2015    Breast Surgery Enlargement Procedure    TONSILLECTOMY  03/27/2015    Tonsillectomy       Outpatient Medications:  Current Outpatient Medications   Medication Instructions    acetaminophen (Tylenol) 500 mg tablet 1 tablet, oral, Every 6 hours PRN    Adderall XR 20 mg 24 hr capsule 2 capsules, oral, Every morning, for ADHD    albuterol 90 mcg/actuation aerosol powdr breath activated inhaler 2 puffs, inhalation, Every 6 hours    albuterol sulfate (Proair Digihaler) 90 mcg/actuation aero powdr breath act w/sensor inhaler inhalation, 4 times daily PRN, TAKE METERED DOSE FOUR TIMES A DAY AS NEEDED    albuterol 2.5 mg, nebulization, Every 6 hours PRN    amphetamine-dextroamphetamine XR (Adderall XR) 30 mg 24 hr capsule 30 mg, oral, Daily    BD Luer-Colleen Syringe 3 mL 23 gauge x 1 1/2\" syringe USE AS DIRECTED WITH B-12    brompheniramine-pseudoeph-DM 2-30-10 mg/5 mL syrup oral, 4 times daily PRN    cholecalciferol (Vitamin D-3) 125 MCG (5000 UT) capsule 1 tablet, oral, Daily    cyanocobalamin (VITAMIN B-12) 1,000 mcg, intramuscular, INJECT 1ML INTRAMUSCULARLY ONCE WEEKLY ,FOR 4 WEEKS,THEN ONCE MONTHLY THEREAFTER<BR>    docusate sodium (COLACE) 100 mg, oral, 2 times daily    EPINEPHrine 0.3 mg/0.3 mL injection syringe EPINEPHrine 0.3 MG/0.3ML " Injection Solution Auto-injector   Quantity: 2  Refills: 0        Start : 26-Jun-2021   Active    ergocalciferol (Vitamin D-2) 1.25 MG (47690 UT) capsule 1 capsule, oral, Weekly    escitalopram (LEXAPRO) 20 mg, oral, Every morning    fluticasone (Flonase) 50 mcg/actuation nasal spray 2 sprays, Each Nostril, Daily    ibuprofen 600 mg, oral, Every 8 hours PRN    lamoTRIgine (LaMICtal) 200 mg tablet 1 tablet, oral, Nightly    lansoprazole (Prevacid) 30 mg DR capsule oral    levothyroxine (SYNTHROID, LEVOXYL) 125 mcg, oral, Daily    levothyroxine (SYNTHROID, LEVOXYL) 88 mcg, oral, Daily    liothyronine (CYTOMEL) 5 mcg, oral, Daily    Lo Loestrin Fe 1 mg-10 mcg (24)/10 mcg (2) tablet 1 tablet, oral, Daily    lurasidone (LATUDA) 80 mg, oral, Daily    naproxen (Naprosyn) 500 mg tablet 1 tablet, oral, 2 times daily PRN    norethindrone-e.estradioL-iron (Junel FE 1/20, 28,) 1 mg-20 mcg (21)/75 mg (7) tablet 1 tablet, oral, Daily, Takes continuously, skipping placebo week.    ondansetron (ZOFRAN) 4 mg, oral, Every 6 hours PRN    Rexulti 1 mg tablet Take 1.5 tablets (1.5 mg) by mouth once daily.    saccharomyces boulardii (FLORASTOR) 250 mg, oral, 2 times daily PRN    Saline Mist 0.65 % nasal spray 2 sprays, Each Nostril, Every 4 hours    sodium chloride (Saline Mist) 0.65 % nasal spray 2 sprays, nasal, 4 times daily    traMADol (Ultram) 50 mg tablet 1 tablet, oral, Every 8 hours PRN    traMADol (ULTRAM) 50 mg, oral, Every 6 hours PRN    traZODone (DESYREL) 150 mg, oral, Nightly    Vyvanse 50 mg, oral, Daily    zolpidem (AMBIEN) 5 mg, oral, Nightly PRN         Relevant Results:   I have reviewed her prior CT and MRI imaging which demonstrates a petrous apex  signal abnormality on the right which has been stable between the September 2022 and March 2022 imaging,      Assessment/Plan   There were no encounter diagnoses.  The patient's imaging demonstrates a small right petrous apex lesion which appears to be cystic and  consistent with a benign lesion, a mucous cyst or cholesterol granuloma potentially.    This was reviewed at our multidisciplinary skull base conference, and based on her symptoms, it is not clear that the finding accounts for her symptomatology.  Furthermore we discussed that surgery for the lesion would be high risk given its location at the medial aspect of the petrous ridge and medial to the carotid, and the likelihood that drainage would result in formation and recurrence of the lesion over time.    Given the nature of her symptoms, it would be reasonable to have her evaluated by our headache/neurology colleagues and consideration also of diagnoses such as occipital neuralgia.    We will see her in follow-up as needed.  Total time for this visit including review of imaging was 35 minutes.

## 2024-02-26 ENCOUNTER — OFFICE VISIT (OUTPATIENT)
Dept: NEUROLOGY | Facility: CLINIC | Age: 49
End: 2024-02-26
Payer: MEDICARE

## 2024-02-26 VITALS
HEIGHT: 61 IN | HEART RATE: 95 BPM | RESPIRATION RATE: 16 BRPM | DIASTOLIC BLOOD PRESSURE: 76 MMHG | SYSTOLIC BLOOD PRESSURE: 143 MMHG | WEIGHT: 194 LBS | BODY MASS INDEX: 36.63 KG/M2

## 2024-02-26 DIAGNOSIS — G43.709 CHRONIC MIGRAINE W/O AURA W/O STATUS MIGRAINOSUS, NOT INTRACTABLE: Primary | ICD-10-CM

## 2024-02-26 DIAGNOSIS — G43.109 MIGRAINE WITH AURA, NOT INTRACTABLE, WITHOUT STATUS MIGRAINOSUS: ICD-10-CM

## 2024-02-26 PROCEDURE — 1036F TOBACCO NON-USER: CPT | Performed by: STUDENT IN AN ORGANIZED HEALTH CARE EDUCATION/TRAINING PROGRAM

## 2024-02-26 PROCEDURE — 99205 OFFICE O/P NEW HI 60 MIN: CPT | Performed by: STUDENT IN AN ORGANIZED HEALTH CARE EDUCATION/TRAINING PROGRAM

## 2024-02-26 RX ORDER — RIZATRIPTAN BENZOATE 10 MG/1
10 TABLET ORAL ONCE AS NEEDED
Qty: 9 TABLET | Refills: 6 | Status: SHIPPED | OUTPATIENT
Start: 2024-02-26 | End: 2024-05-28 | Stop reason: SDUPTHER

## 2024-02-26 RX ORDER — SEMAGLUTIDE 0.25 MG/.5ML
0.25 INJECTION, SOLUTION SUBCUTANEOUS
COMMUNITY
Start: 2023-04-05

## 2024-02-26 RX ORDER — TOPIRAMATE 25 MG/1
TABLET ORAL
Qty: 60 TABLET | Refills: 4 | Status: SHIPPED
Start: 2024-02-26 | End: 2024-05-28 | Stop reason: SDUPTHER

## 2024-02-26 RX ORDER — ESTRADIOL/NORETHINDRONE ACETATE TRANSDERMAL SYSTEM .05; .14 MG/D; MG/D
1 PATCH, EXTENDED RELEASE TRANSDERMAL
COMMUNITY
Start: 2024-02-15 | End: 2025-02-14

## 2024-02-26 RX ORDER — ESTRADIOL/NORETHINDRONE ACETATE TRANSDERMAL SYSTEM .05; .14 MG/D; MG/D
PATCH, EXTENDED RELEASE TRANSDERMAL
COMMUNITY
Start: 2024-02-13

## 2024-02-26 RX ORDER — ONDANSETRON 4 MG/1
4 TABLET, FILM COATED ORAL EVERY 6 HOURS PRN
Qty: 20 TABLET | Refills: 3 | Status: SHIPPED | OUTPATIENT
Start: 2024-02-26

## 2024-02-26 ASSESSMENT — PATIENT HEALTH QUESTIONNAIRE - PHQ9
1. LITTLE INTEREST OR PLEASURE IN DOING THINGS: SEVERAL DAYS
SUM OF ALL RESPONSES TO PHQ9 QUESTIONS 1 AND 2: 2
2. FEELING DOWN, DEPRESSED OR HOPELESS: SEVERAL DAYS
10. IF YOU CHECKED OFF ANY PROBLEMS, HOW DIFFICULT HAVE THESE PROBLEMS MADE IT FOR YOU TO DO YOUR WORK, TAKE CARE OF THINGS AT HOME, OR GET ALONG WITH OTHER PEOPLE: NOT DIFFICULT AT ALL

## 2024-02-26 ASSESSMENT — PAIN SCALES - GENERAL: PAINLEVEL: 2

## 2024-02-26 ASSESSMENT — ENCOUNTER SYMPTOMS
LOSS OF SENSATION IN FEET: 0
OCCASIONAL FEELINGS OF UNSTEADINESS: 0
DEPRESSION: 1

## 2024-02-26 NOTE — PROGRESS NOTES
Subjective     Chief Complaint: Headache    Katy Hogue is a 48 y.o. year old female who presents with chief complaint of headaches.    Katy started getting ear aches in her 30s. Was initially having headaches 3/30 days per month. In the last 5 years, headaches worsened in frequency and severity., was starting to feel increased pain in the back of the head whenever she was bending over. Currently having 30/30 HA days per month. Can not remember last crystal clear headache free day. The headaches are usually pressure like in quality and are located occipitally, but radiates temporal and frontal, generally unilateral R>L. The patient rates her most severe headaches a 6 in intensity. Generally, headaches last about 4 hours in duration before, but now are constant. Associated nausea, photophobia, phonophobia, and vomiting. Headaches are worsened with exertion. Triggers include stress.    Aura of flashing red lights lasting 1-2 hours in duration. This occurs 2-3 times per month, with the more severe headaches.    Headaches now and no different in phenotype than headaches in her 30's, but mainly just increased in frequency. Occasional jabs and jolts phenomena.    Notes difficulty staying asleep at night. Snores loudly and was to have a sleep study done, but fell through. Having nightmares every night.     Started menopause 3 years ago.     Current Acute Headache Treatment Aspirin daily (relieves)  Ibuprofen daily (relieves)   Current Preventative Headache Treatment None   Previous Acute Headache Treatment  None   Previous Preventative Headache Treatment None     ROS: As per HPI, otherwise all other systems have been reviewed are negative for complaint.     Past Medical History:   Diagnosis Date    Carpal tunnel syndrome, unspecified upper limb 12/17/2013    Carpal tunnel syndrome    Disease of stomach and duodenum, unspecified 12/17/2013    Gastric and duodenal disease    Esophagitis, unspecified without bleeding  "12/17/2013    Esophagitis    Other intervertebral disc displacement, lumbar region 12/17/2013    Herniated nucleus pulposus, L4-5    Other specified abnormal immunological findings in serum     False positive serological test for hepatitis C    Personal history of peptic ulcer disease     History of stomach ulcers    Sacroiliitis, not elsewhere classified (CMS/HCC) 12/17/2013    Sacroiliitis    Spondylosis without myelopathy or radiculopathy, lumbar region 12/17/2013    Lumbar spondylosis     Past Surgical History:   Procedure Laterality Date    CHOLECYSTECTOMY  03/27/2015    Cholecystectomy    OOPHORECTOMY  03/27/2015    Oophorectomy - Unilateral (Removal Of One Ovary)    OTHER SURGICAL HISTORY  03/27/2015    Breast Surgery Enlargement Procedure    TONSILLECTOMY  03/27/2015    Tonsillectomy     Family History   Problem Relation Name Age of Onset    Arthritis Mother      Diabetes Mother      Lupus Mother      Diabetes Father       Social History     Tobacco Use    Smoking status: Former     Types: Cigarettes    Smokeless tobacco: Never   Substance Use Topics    Alcohol use: Yes     Comment: ocassionally        Objective   /76   Pulse 95   Resp 16   Ht 1.549 m (5' 1\")   Wt 88 kg (194 lb)   BMI 36.66 kg/m²     Neuro Exam:  Cardiac Exam: No apparent edema of b/l lower extremities  Neurological Exam:  MENTAL STATUS:   General Appearance: No distress, alert, interactive, and cooperative. Orientation was normal to time, place and person. Recent and remote memory was intact.     OPHTHALMOSCOPIC:   The ophthalmoscopic exam was normal. The fundi were well visualized with normal disc margins, clear vessels and vascular pulsations. No disc edema. The cup/disk ratio was not enlarged. No hemorrhages or exudates were present in the posterior segments that were visualized.     CRANIAL NERVES:   CN 2         Visual fields full to confrontation.   CN 3, 4, 6   Pupils round, 4 mm in diameter, equally reactive to light. " Lids symmetric; no ptosis. EOMs normal alignment, full range with normal saccades, pursuit and convergence.   No nystagmus.   CN 5   Facial sensation intact bilaterally.   CN 7   Normal and symmetric facial strength. Nasolabial folds symmetric.   CN 8   Hearing intact to conversation and finger rub, diminished to 80% on R  CN 9, 10   Palate elevates symmetrically.  CN 11   Normal strength of shoulder shrug and neck turning.   CN 12   Tongue midline, with normal bulk and strength; no fasciculations.     MOTOR:   Muscle bulk and tone were normal in both upper and lower extremities.   No pronator drift bilaterally.  No fasciculations, tremor or other abnormal movements evident with the patient examined clothed.    STRENGTH:  R  L  Deltoid            5          5  Biceps  5 5  Triceps  5 5    Hip flexion 5 5  Knee Flex 5 5  Knee Ex 5 5    REFLEXES: R L  Biceps  2 2                     Triceps  2 2  Patellar  2 2     SENSORY:   In both upper and lower extremities, sensation was intact to light touch.    COORDINATION:   In both upper extremities, finger-nose-finger was intact without dysmetria or overshoot.     GAIT:   Station was stable with a normal base. Gait was stable with a normal arm swing and speed. No ataxia, shuffling, steppage or waddling was present. No circumduction was present. No Romberg sign was present.    Assessment/Plan   Given frequency and description of headaches, patient likely has chronic migraine wwo aura, likely worsening in the setting of menopause. Per our discussion, will start topiramate for migraine ppx and rizatriptan for breakthrough headaches. Suspicion for BLAS, for which patient plans on having sleep study done with [facility]. Likely also has MOH, and recommended discontinuation of OTC medications. MRI brain personally reviewed and shows abnormality in the petrous apex, however low suspicion that this contributing to headaches.     - discontinue OTC headache medications  - sleep study   -  start topiramate 50mg at bedtime  - start rizatriptan 10mg PRN  - follow up in 3 months    I personally spent 60 minutes today, exclusive of procedures, providing care for this patient, including preparation, face to face time, documentation and other services such as review of medical records, diagnostic result, patient education, counseling, coordination of care as specified in the encounter.

## 2024-02-26 NOTE — PATIENT INSTRUCTIONS
Given the description of your headaches, I suspect you have chronic migraine with and without aura. Per our discussion, we are starting topiramate for migraine prevention and rizatriptan for breakthrough headaches. Please discontinue your daily over the counter headache medications. Please be sure to follow up with your sleep study.     Follow up in 3 months.    _______________________________________  You have been prescribed topiramate 25mg tablets, with the goal to titrate up to 2 tablets (50 mg) per day.    For the first week, take 1 tablet (25mg) in the evening.   If no side effects and tolerating well, increase to 2 tablets (50mg) in the evening.    It could take up the 3 months to start seeing improvement in your headaches.     The most common side effects include numbness/tingling in your fingers/toes/around mouth, loss of appetite/weight loss, metallic taste to carbonated beverages, and some cognitive slowing. More serious side effects include kidney stones and worsening depression. If any of these symptoms are experienced and not tolerated, please contact my office to discuss alternative options.  ______________________________________  You have been prescribed a medication called Rizatriptan at a dose of 10mg. This medication is to be taken as needed to stop a migraine. It is most effective if taken at onset of headache, with a goal of completely resolving a migraine within 2 hours. If you do not have complete resolution of headache within 2 hours, take a second tablet. Max daily dose is 3 tablets (30mg).    Please take this medication less than 10 times per month. Taken too often, there is a chance of developing medication overuse headache, which would result in a higher migraine frequency.     Common side effects include light-headed sensation, drowsiness, and restlessness. Another side effect is the sensation of chest/neck tightness that can last minutes to a few hours. You should not have any  difficulty breathing or irregular heart rhythm with this side effect, and it self resolves.    Do not take this medication if you have uncontrolled blood pressure or a history of heart attack, as it can cause vasospasm.     If you do not tolerate this medication, please discontinue its use, and we can discuss alternative acute medications at your upcoming appointment.

## 2024-04-19 ENCOUNTER — APPOINTMENT (OUTPATIENT)
Dept: NEUROLOGY | Facility: CLINIC | Age: 49
End: 2024-04-19
Payer: COMMERCIAL

## 2024-05-28 ENCOUNTER — TELEMEDICINE (OUTPATIENT)
Dept: NEUROLOGY | Facility: CLINIC | Age: 49
End: 2024-05-28
Payer: MEDICARE

## 2024-05-28 DIAGNOSIS — G43.709 CHRONIC MIGRAINE W/O AURA W/O STATUS MIGRAINOSUS, NOT INTRACTABLE: ICD-10-CM

## 2024-05-28 DIAGNOSIS — G43.109 MIGRAINE WITH AURA, NOT INTRACTABLE, WITHOUT STATUS MIGRAINOSUS: ICD-10-CM

## 2024-05-28 PROCEDURE — 99214 OFFICE O/P EST MOD 30 MIN: CPT | Performed by: STUDENT IN AN ORGANIZED HEALTH CARE EDUCATION/TRAINING PROGRAM

## 2024-05-28 RX ORDER — RIZATRIPTAN BENZOATE 10 MG/1
10 TABLET ORAL ONCE AS NEEDED
Qty: 9 TABLET | Refills: 6 | Status: SHIPPED | OUTPATIENT
Start: 2024-05-28 | End: 2024-06-27

## 2024-05-28 RX ORDER — TOPIRAMATE 50 MG/1
TABLET, FILM COATED ORAL
Qty: 90 TABLET | Refills: 3 | Status: SHIPPED | OUTPATIENT
Start: 2024-05-28

## 2024-05-28 NOTE — PROGRESS NOTES
"Virtual or Telephone Consent    An interactive audio and video telecommunication system which permits real time communications between the patient (at the originating site) and provider (at the distant site) was utilized to provide this telehealth service.   Verbal consent was requested and obtained from Katy Hogue on this date, 05/28/24 for a telehealth visit.     Subjective   Katy Hogue is a 48 y.o.   female who is being followed for chronic migraine wwo aura.     Since last seen, patient states that headaches have improved to 6/30 HA days per month. Notes one her triggers is chicken and waffles. Stress and loud noises will also trigger headaches. Rizatriptan is effective for breakthrough headaches, but causing grogginess. Topiramate was causing significant nausea initially, but has improved over time and no longer an issue.     Notes some changes in menses since starting topiramate. Has been following OBGYN. Was switched to estrogen only which has regulated menses.     Has lost ~30 pounds since last seen. Has been dieting and exercising.     Current Outpatient Medications   Medication Instructions    acetaminophen (Tylenol) 500 mg tablet 1 tablet, oral, Every 6 hours PRN    Adderall XR 20 mg 24 hr capsule 2 capsules, oral, Every morning, for ADHD    albuterol 90 mcg/actuation aerosol powdr breath activated inhaler 2 puffs, inhalation, Every 6 hours    albuterol sulfate (Proair Digihaler) 90 mcg/actuation aero powdr breath act w/sensor inhaler inhalation, 4 times daily PRN, TAKE METERED DOSE FOUR TIMES A DAY AS NEEDED    albuterol 2.5 mg, nebulization, Every 6 hours PRN    amphetamine-dextroamphetamine XR (Adderall XR) 30 mg 24 hr capsule 30 mg, oral, Daily    BD Luer-Colleen Syringe 3 mL 23 gauge x 1 1/2\" syringe USE AS DIRECTED WITH B-12    brompheniramine-pseudoeph-DM 2-30-10 mg/5 mL syrup oral, 4 times daily PRN    cholecalciferol (Vitamin D-3) 125 MCG (5000 UT) capsule 1 tablet, oral, Daily    " CombiPatch 0.05-0.14 mg/24 hr Place 1 patch over 96 hours on the skin 2 (two) times a week    cyanocobalamin (VITAMIN B-12) 1,000 mcg, intramuscular, INJECT 1ML INTRAMUSCULARLY ONCE WEEKLY ,FOR 4 WEEKS,THEN ONCE MONTHLY THEREAFTER<BR>    docusate sodium (COLACE) 100 mg, oral, 2 times daily    EPINEPHrine 0.3 mg/0.3 mL injection syringe EPINEPHrine 0.3 MG/0.3ML Injection Solution Auto-injector   Quantity: 2  Refills: 0        Start : 26-Jun-2021   Active    ergocalciferol (Vitamin D-2) 1.25 MG (64019 UT) capsule 1 capsule, oral, Once Weekly    escitalopram (LEXAPRO) 20 mg, oral, Every morning    estradiol-norethindrone acet (CombiPatch) 0.05-0.14 mg/24 hr 1 patch, transdermal    fluticasone (Flonase) 50 mcg/actuation nasal spray 2 sprays, Each Nostril, Daily    ibuprofen 600 mg, oral, Every 8 hours PRN    lamoTRIgine (LaMICtal) 200 mg tablet 1 tablet, oral, Nightly    lansoprazole (Prevacid) 30 mg DR capsule oral    levothyroxine (SYNTHROID, LEVOXYL) 125 mcg, oral, Daily    levothyroxine (SYNTHROID, LEVOXYL) 88 mcg, oral, Daily    liothyronine (CYTOMEL) 5 mcg, oral, Daily    Lo Loestrin Fe 1 mg-10 mcg (24)/10 mcg (2) tablet 1 tablet, oral, Daily    naproxen (Naprosyn) 500 mg tablet 1 tablet, oral, 2 times daily PRN    norethindrone-e.estradioL-iron (Junel FE 1/20, 28,) 1 mg-20 mcg (21)/75 mg (7) tablet 1 tablet, oral, Daily, Takes continuously, skipping placebo week.    ondansetron (ZOFRAN) 4 mg, oral, Every 6 hours PRN    pseudoephedrine-DM-guaifenesin 60- mg tablet oral    Rexulti 1 mg tablet Take 1.5 tablets (1.5 mg) by mouth once daily.    rizatriptan (MAXALT) 10 mg, oral, Once as needed, May repeat in 2 hours if unresolved. Do not exceed 30 mg in 24 hours.    saccharomyces boulardii (FLORASTOR) 250 mg, oral, 2 times daily PRN    Saline Mist 0.65 % nasal spray 2 sprays, Each Nostril, Every 4 hours    sodium chloride (Saline Mist) 0.65 % nasal spray 2 sprays, nasal, 4 times daily    topiramate (Topamax) 25  mg tablet Take 25mg at night for 1 week, then increase to 50mg at night    traMADol (Ultram) 50 mg tablet 1 tablet, oral, Every 8 hours PRN    traMADol (ULTRAM) 50 mg, oral, Every 6 hours PRN    traZODone (DESYREL) 150 mg, oral, Nightly    Vyvanse 50 mg, oral, Daily    Wegovy 0.25 mg, subcutaneous    zolpidem (AMBIEN) 5 mg, oral, Nightly PRN       Assessment/Plan   Patient with chronic migraine wwo aura. Headaches have been well controlled with topiramate and rizatriptan. Minimal side effect to either medication at this time. Patient satisfied with current headache management. Will refill medications for 1 year with subsequent refills per PCP.    - continue topiramate 50mg at bedtime  - continue rizatriptan 10mg PRN  - follow up PRN

## 2024-11-20 DIAGNOSIS — G43.709 CHRONIC MIGRAINE W/O AURA W/O STATUS MIGRAINOSUS, NOT INTRACTABLE: ICD-10-CM

## 2024-11-20 DIAGNOSIS — G43.109 MIGRAINE WITH AURA, NOT INTRACTABLE, WITHOUT STATUS MIGRAINOSUS: ICD-10-CM

## 2024-11-20 RX ORDER — TOPIRAMATE 50 MG/1
TABLET, FILM COATED ORAL
Qty: 90 TABLET | Refills: 3 | Status: SHIPPED | OUTPATIENT
Start: 2024-11-20

## 2025-01-12 ENCOUNTER — OFFICE VISIT (OUTPATIENT)
Dept: URGENT CARE | Age: 50
End: 2025-01-12
Payer: COMMERCIAL

## 2025-01-12 VITALS
HEIGHT: 61 IN | SYSTOLIC BLOOD PRESSURE: 130 MMHG | WEIGHT: 151 LBS | HEART RATE: 80 BPM | OXYGEN SATURATION: 97 % | TEMPERATURE: 98.1 F | DIASTOLIC BLOOD PRESSURE: 88 MMHG | RESPIRATION RATE: 18 BRPM | BODY MASS INDEX: 28.51 KG/M2

## 2025-01-12 DIAGNOSIS — R30.0 DYSURIA: ICD-10-CM

## 2025-01-12 LAB
POC APPEARANCE, URINE: CLEAR
POC BILIRUBIN, URINE: NEGATIVE
POC BLOOD, URINE: NEGATIVE
POC COLOR, URINE: YELLOW
POC GLUCOSE, URINE: NEGATIVE MG/DL
POC KETONES, URINE: NEGATIVE MG/DL
POC LEUKOCYTES, URINE: NEGATIVE
POC NITRITE,URINE: NEGATIVE
POC PH, URINE: 6 PH
POC PROTEIN, URINE: NEGATIVE MG/DL
POC SPECIFIC GRAVITY, URINE: 1.02
POC UROBILINOGEN, URINE: 0.2 EU/DL

## 2025-01-12 PROCEDURE — 87086 URINE CULTURE/COLONY COUNT: CPT

## 2025-01-12 PROCEDURE — 3008F BODY MASS INDEX DOCD: CPT | Performed by: PHYSICIAN ASSISTANT

## 2025-01-12 PROCEDURE — 81003 URINALYSIS AUTO W/O SCOPE: CPT | Performed by: PHYSICIAN ASSISTANT

## 2025-01-12 PROCEDURE — 99204 OFFICE O/P NEW MOD 45 MIN: CPT | Performed by: PHYSICIAN ASSISTANT

## 2025-01-12 RX ORDER — SULFAMETHOXAZOLE AND TRIMETHOPRIM 800; 160 MG/1; MG/1
1 TABLET ORAL 2 TIMES DAILY
Qty: 14 TABLET | Refills: 0 | Status: SHIPPED | OUTPATIENT
Start: 2025-01-12 | End: 2025-01-19

## 2025-01-12 ASSESSMENT — ENCOUNTER SYMPTOMS
NAUSEA: 0
SWEATS: 0
CHILLS: 0
VOMITING: 0
HEMATURIA: 1
DYSURIA: 1
FLANK PAIN: 0
FREQUENCY: 1

## 2025-01-12 NOTE — PROGRESS NOTES
"Subjective   Patient ID: Katy Hogue is a 49 y.o. female who presents for painful urination (Pt was in ER last Friday for UTI with bleeding vaginally. Took full dose of macrobid and it came back. Pt states having cramps with UTI. ).  HPI  Patient presents for urinary symptoms.  Patient was seen and treated for UTI with Macrobid that ended 3 days ago.  Patient reports return of symptoms this morning to include pain with urination and suprapubic discomfort.  No fever or chills.  No other complaints.    Review of Systems    Constitutional:  See HPI   Genitourinary: See HPI  Neurologic:  Alert and oriented X4, No numbness, No tingling.    All other systems are negative     Objective     /88   Pulse 80   Temp 36.7 °C (98.1 °F) (Oral)   Resp 18   Ht 1.549 m (5' 1\")   Wt 68.5 kg (151 lb)   SpO2 97%   BMI 28.53 kg/m²     Physical Exam    General:  Alert and oriented, No acute distress.    Eye:  Pupils are equal, round and reactive to light, Normal conjunctiva.    HENT:  Normocephalic,   Neck:  Supple    Respiratory: Respirations are non-labored   Musculoskeletal: Normal ROM and strength  Integumentary:  Warm, Dry, Intact, No pallor, No rash.    Neurologic:  Alert, Oriented, Normal sensory, Cranial Nerves II-XII are grossly intact  Psychiatric:  Cooperative, Appropriate mood & affect.    Assessment/Plan   Urinalysis is unremarkable.  Patient requesting treatment despite this.  Prescription for Bactrim.  Urine sent for culture.  Patient's clinical presentation is otherwise unremarkable at this time. Patient is discharged with instructions to follow-up with primary care or seek emergency medical attention for worsening symptoms or any new concerns.  Problem List Items Addressed This Visit       Dysuria    Relevant Medications    sulfamethoxazole-trimethoprim (Bactrim DS) 800-160 mg tablet    Other Relevant Orders    POCT UA Automated manually resulted (Completed)    Urine Culture       Final diagnoses: "   [R30.0] Dysuria

## 2025-01-14 LAB — BACTERIA UR CULT: ABNORMAL

## 2025-01-17 ENCOUNTER — TELEPHONE (OUTPATIENT)
Dept: URGENT CARE | Age: 50
End: 2025-01-17

## 2025-01-17 DIAGNOSIS — N30.00 ACUTE CYSTITIS WITHOUT HEMATURIA: Primary | ICD-10-CM

## 2025-01-17 RX ORDER — NITROFURANTOIN 25; 75 MG/1; MG/1
100 CAPSULE ORAL 2 TIMES DAILY
Qty: 10 CAPSULE | Refills: 0 | Status: SHIPPED | OUTPATIENT
Start: 2025-01-17 | End: 2025-01-22

## 2025-01-17 NOTE — TELEPHONE ENCOUNTER
Patient called and given results of urine culture. Patient antibiotic changed Macrobid based on lab report and patient request.